# Patient Record
Sex: FEMALE | Race: WHITE | NOT HISPANIC OR LATINO | Employment: PART TIME | URBAN - METROPOLITAN AREA
[De-identification: names, ages, dates, MRNs, and addresses within clinical notes are randomized per-mention and may not be internally consistent; named-entity substitution may affect disease eponyms.]

---

## 2018-02-16 ENCOUNTER — OFFICE VISIT (OUTPATIENT)
Dept: URGENT CARE | Facility: CLINIC | Age: 68
End: 2018-02-16
Payer: COMMERCIAL

## 2018-02-16 VITALS
HEART RATE: 66 BPM | DIASTOLIC BLOOD PRESSURE: 86 MMHG | TEMPERATURE: 97.3 F | RESPIRATION RATE: 16 BRPM | SYSTOLIC BLOOD PRESSURE: 136 MMHG | WEIGHT: 197 LBS | BODY MASS INDEX: 32.82 KG/M2 | OXYGEN SATURATION: 97 % | HEIGHT: 65 IN

## 2018-02-16 DIAGNOSIS — J02.9 ACUTE VIRAL PHARYNGITIS: Primary | ICD-10-CM

## 2018-02-16 LAB — S PYO AG THROAT QL: NEGATIVE

## 2018-02-16 PROCEDURE — 99213 OFFICE O/P EST LOW 20 MIN: CPT | Performed by: PREVENTIVE MEDICINE

## 2018-02-16 PROCEDURE — S9088 SERVICES PROVIDED IN URGENT: HCPCS | Performed by: PREVENTIVE MEDICINE

## 2018-02-16 NOTE — PROGRESS NOTES
[id number]        NAME: Vicente Wills is a 75 y o  female  : 1950    MRN: [id number]  DATE: 2018  TIME: 3:52 PM    Assessment and Plan   Acute viral pharyngitis [J02 [id number], B97 [id number]]  1  Acute viral pharyngitis           Patient Instructions     Follow up with PCP in 3-5 days if symptoms become worse  Chief Complaint     Chief Complaint   Patient presents with    Sore Throat     2 days   no cough stuffy in nose area   took dayquil    Headache     4 - 5         History of Present Illness   Vicente Wills presents to the clinic c/o    Last couple of days fatigued low-grade fevers sore throat and aches  Review of Systems   Review of Systems   Constitutional: Positive for fatigue  HENT: Positive for congestion and sore throat  Current Medications     No long-term prescriptions on file  Current Allergies     Allergies as of 2018    (No Known Allergies)            The following portions of the patient's history were reviewed and updated as appropriate: allergies, current medications, past family history, past medical history, past social history, past surgical history and problem list     Objective   /86   Pulse 66   Temp (!) 97 3 °F (36 3 °C)   Resp 16   Ht 5' 5" (1 651 m)   Wt 89 4 kg (197 lb)   SpO2 97%   BMI 32 78 kg/m²        Physical Exam     Physical Exam   HENT:   Right Ear: External ear normal    Left Ear: External ear normal    Mouth/Throat: Oropharynx is clear and moist    Cardiovascular: Normal heart sounds  Pulmonary/Chest: Breath sounds normal    Lymphadenopathy:     She has no cervical adenopathy

## 2018-02-16 NOTE — PATIENT INSTRUCTIONS
Drink at least 6 or 8 glasses of water or juice a day  Using a vaporizer or humidifier in the bedroom will be very helpful  Motrin or Aleve or aspirin for fever chills or aches  Robitussin DM or NyQuil for cough  Afrin nasal spray for facial and head congestion  Inhaling steam coming up from the sink will be very helpful  If symptoms are getting worse over the next 4-7 days you must be rechecked